# Patient Record
Sex: FEMALE | Race: WHITE | NOT HISPANIC OR LATINO
[De-identification: names, ages, dates, MRNs, and addresses within clinical notes are randomized per-mention and may not be internally consistent; named-entity substitution may affect disease eponyms.]

---

## 2017-10-24 ENCOUNTER — APPOINTMENT (OUTPATIENT)
Dept: DERMATOLOGY | Facility: CLINIC | Age: 57
End: 2017-10-24
Payer: COMMERCIAL

## 2017-10-24 PROCEDURE — 99214 OFFICE O/P EST MOD 30 MIN: CPT

## 2018-07-20 ENCOUNTER — INBOUND DOCUMENT (OUTPATIENT)
Age: 58
End: 2018-07-20

## 2018-10-08 ENCOUNTER — INBOUND DOCUMENT (OUTPATIENT)
Age: 58
End: 2018-10-08

## 2018-10-23 ENCOUNTER — APPOINTMENT (OUTPATIENT)
Dept: DERMATOLOGY | Facility: CLINIC | Age: 58
End: 2018-10-23
Payer: MEDICARE

## 2018-10-23 PROCEDURE — 99213 OFFICE O/P EST LOW 20 MIN: CPT

## 2019-02-15 ENCOUNTER — INBOUND DOCUMENT (OUTPATIENT)
Age: 59
End: 2019-02-15

## 2019-12-02 ENCOUNTER — APPOINTMENT (OUTPATIENT)
Dept: DERMATOLOGY | Facility: CLINIC | Age: 59
End: 2019-12-02
Payer: MEDICARE

## 2019-12-02 PROCEDURE — 99213 OFFICE O/P EST LOW 20 MIN: CPT

## 2020-12-03 ENCOUNTER — APPOINTMENT (OUTPATIENT)
Dept: DERMATOLOGY | Facility: CLINIC | Age: 60
End: 2020-12-03
Payer: MEDICARE

## 2020-12-03 PROCEDURE — 99214 OFFICE O/P EST MOD 30 MIN: CPT | Mod: 25

## 2020-12-03 PROCEDURE — 17110 DESTRUCTION B9 LES UP TO 14: CPT

## 2021-12-07 ENCOUNTER — APPOINTMENT (OUTPATIENT)
Dept: DERMATOLOGY | Facility: CLINIC | Age: 61
End: 2021-12-07

## 2022-12-15 ENCOUNTER — OFFICE (OUTPATIENT)
Dept: URBAN - METROPOLITAN AREA CLINIC 94 | Facility: CLINIC | Age: 62
Setting detail: OPHTHALMOLOGY
End: 2022-12-15
Payer: MEDICARE

## 2022-12-15 DIAGNOSIS — H44.23: ICD-10-CM

## 2022-12-15 DIAGNOSIS — H35.3131: ICD-10-CM

## 2022-12-15 DIAGNOSIS — H35.413: ICD-10-CM

## 2022-12-15 DIAGNOSIS — H43.811: ICD-10-CM

## 2022-12-15 PROCEDURE — 92014 COMPRE OPH EXAM EST PT 1/>: CPT | Performed by: SPECIALIST

## 2022-12-15 PROCEDURE — 92134 CPTRZ OPH DX IMG PST SGM RTA: CPT | Performed by: SPECIALIST

## 2022-12-15 PROCEDURE — 92235 FLUORESCEIN ANGRPH MLTIFRAME: CPT | Performed by: SPECIALIST

## 2022-12-15 ASSESSMENT — CONFRONTATIONAL VISUAL FIELD TEST (CVF)
OS_FINDINGS: FULL
OD_FINDINGS: FULL

## 2022-12-15 ASSESSMENT — KERATOMETRY
OS_AXISANGLE_DEGREES: 111
OD_K2POWER_DIOPTERS: 45.75
METHOD_AUTO_MANUAL: AUTO
OD_K1POWER_DIOPTERS: 45.75
OS_K1POWER_DIOPTERS: 46.00
OS_K2POWER_DIOPTERS: 46.50
OD_AXISANGLE_DEGREES: 090

## 2022-12-15 ASSESSMENT — VISUAL ACUITY
OD_BCVA: 20/70-1
OS_BCVA: 20/20

## 2022-12-15 ASSESSMENT — AXIALLENGTH_DERIVED
OS_AL: 23.6045
OD_AL: 22.7934

## 2022-12-15 ASSESSMENT — PACHYMETRY
OD_CT_UM: 591
OS_CT_UM: 571
OD_CT_CORRECTION: -4
OS_CT_CORRECTION: -2

## 2022-12-15 ASSESSMENT — REFRACTION_AUTOREFRACTION
OS_CYLINDER: -0.25
OS_SPHERE: -2.50
OS_AXIS: 073
OD_SPHERE: +0.50
OD_AXIS: 094
OD_CYLINDER: -1.00

## 2022-12-15 ASSESSMENT — LID EXAM ASSESSMENTS
OD_BLEPHARITIS: T 1+
OS_BLEPHARITIS: T 1+

## 2022-12-15 ASSESSMENT — SPHEQUIV_DERIVED
OS_SPHEQUIV: -2.625
OD_SPHEQUIV: 0

## 2022-12-15 ASSESSMENT — LID POSITION - PTOSIS
OD_PTOSIS: 2+
OS_PTOSIS: 2+

## 2022-12-15 ASSESSMENT — PUNCTA - ASSESSMENT: OD_PUNCTA: RLL

## 2022-12-22 ENCOUNTER — OFFICE (OUTPATIENT)
Dept: URBAN - METROPOLITAN AREA CLINIC 113 | Facility: CLINIC | Age: 62
Setting detail: OPHTHALMOLOGY
End: 2022-12-22
Payer: MEDICARE

## 2022-12-22 ENCOUNTER — RX ONLY (RX ONLY)
Age: 62
End: 2022-12-22

## 2022-12-22 DIAGNOSIS — H11.151: ICD-10-CM

## 2022-12-22 DIAGNOSIS — H02.821: ICD-10-CM

## 2022-12-22 DIAGNOSIS — H01.002: ICD-10-CM

## 2022-12-22 DIAGNOSIS — H01.005: ICD-10-CM

## 2022-12-22 PROCEDURE — 92012 INTRM OPH EXAM EST PATIENT: CPT | Performed by: OPHTHALMOLOGY

## 2022-12-22 ASSESSMENT — KERATOMETRY
OD_K1POWER_DIOPTERS: 45.75
OS_K2POWER_DIOPTERS: 46.50
OD_AXISANGLE_DEGREES: 090
OD_K2POWER_DIOPTERS: 45.75
OS_K1POWER_DIOPTERS: 46.00
METHOD_AUTO_MANUAL: AUTO
OS_AXISANGLE_DEGREES: 111

## 2022-12-22 ASSESSMENT — REFRACTION_AUTOREFRACTION
OS_SPHERE: -2.50
OD_SPHERE: +0.50
OS_CYLINDER: -0.25
OD_AXIS: 094
OD_CYLINDER: -1.00
OS_AXIS: 073

## 2022-12-22 ASSESSMENT — VISUAL ACUITY
OS_BCVA: 20/20
OD_BCVA: 20/100

## 2022-12-22 ASSESSMENT — PACHYMETRY
OS_CT_CORRECTION: -2
OD_CT_CORRECTION: -4
OD_CT_UM: 591
OS_CT_UM: 571

## 2022-12-22 ASSESSMENT — TONOMETRY
OD_IOP_MMHG: 19
OS_IOP_MMHG: 20

## 2022-12-22 ASSESSMENT — CONFRONTATIONAL VISUAL FIELD TEST (CVF)
OS_FINDINGS: FULL
OD_FINDINGS: FULL

## 2022-12-22 ASSESSMENT — AXIALLENGTH_DERIVED
OS_AL: 23.6045
OD_AL: 22.7934

## 2022-12-22 ASSESSMENT — PUNCTA - ASSESSMENT: OD_PUNCTA: RLL

## 2022-12-22 ASSESSMENT — LID POSITION - PTOSIS
OD_PTOSIS: 2+
OS_PTOSIS: 2+

## 2022-12-22 ASSESSMENT — SPHEQUIV_DERIVED
OD_SPHEQUIV: 0
OS_SPHEQUIV: -2.625

## 2022-12-22 ASSESSMENT — LID EXAM ASSESSMENTS
OD_BLEPHARITIS: T 1+
OS_BLEPHARITIS: T 1+

## 2023-02-23 ENCOUNTER — OFFICE (OUTPATIENT)
Dept: URBAN - METROPOLITAN AREA CLINIC 113 | Facility: CLINIC | Age: 63
Setting detail: OPHTHALMOLOGY
End: 2023-02-23
Payer: MEDICARE

## 2023-02-23 DIAGNOSIS — H01.005: ICD-10-CM

## 2023-02-23 DIAGNOSIS — H02.821: ICD-10-CM

## 2023-02-23 DIAGNOSIS — H11.151: ICD-10-CM

## 2023-02-23 DIAGNOSIS — H01.002: ICD-10-CM

## 2023-02-23 PROCEDURE — 99213 OFFICE O/P EST LOW 20 MIN: CPT | Performed by: OPHTHALMOLOGY

## 2023-02-23 ASSESSMENT — KERATOMETRY
OS_K2POWER_DIOPTERS: 46.50
OD_K2POWER_DIOPTERS: 45.75
OD_AXISANGLE_DEGREES: 090
METHOD_AUTO_MANUAL: AUTO
OS_K1POWER_DIOPTERS: 46.00
OD_K1POWER_DIOPTERS: 45.75
OS_AXISANGLE_DEGREES: 111

## 2023-02-23 ASSESSMENT — REFRACTION_AUTOREFRACTION
OD_CYLINDER: -1.00
OD_SPHERE: +0.50
OD_AXIS: 094
OS_AXIS: 073
OS_SPHERE: -2.50
OS_CYLINDER: -0.25

## 2023-02-23 ASSESSMENT — LID EXAM ASSESSMENTS
OS_COMMENTS: DERMATITIS
OD_MEIBOMITIS: RLL T
OD_COMMENTS: DERMATITIS
OS_MEIBOMITIS: LLL T
OS_BLEPHARITIS: LLL T 1+
OD_BLEPHARITIS: RLL T 1+

## 2023-02-23 ASSESSMENT — VISUAL ACUITY
OS_BCVA: 20/20
OD_BCVA: 20/200

## 2023-02-23 ASSESSMENT — LID POSITION - PTOSIS
OD_PTOSIS: 2+
OS_PTOSIS: 2+

## 2023-02-23 ASSESSMENT — CONFRONTATIONAL VISUAL FIELD TEST (CVF)
OD_FINDINGS: FULL
OS_FINDINGS: FULL

## 2023-02-23 ASSESSMENT — AXIALLENGTH_DERIVED
OD_AL: 22.7934
OS_AL: 23.6045

## 2023-02-23 ASSESSMENT — SPHEQUIV_DERIVED
OD_SPHEQUIV: 0
OS_SPHEQUIV: -2.625

## 2023-06-15 ENCOUNTER — OFFICE (OUTPATIENT)
Dept: URBAN - METROPOLITAN AREA CLINIC 94 | Facility: CLINIC | Age: 63
Setting detail: OPHTHALMOLOGY
End: 2023-06-15
Payer: MEDICARE

## 2023-06-15 DIAGNOSIS — H35.3131: ICD-10-CM

## 2023-06-15 DIAGNOSIS — H35.413: ICD-10-CM

## 2023-06-15 DIAGNOSIS — H43.811: ICD-10-CM

## 2023-06-15 DIAGNOSIS — H44.23: ICD-10-CM

## 2023-06-15 PROCEDURE — 92134 CPTRZ OPH DX IMG PST SGM RTA: CPT | Performed by: SPECIALIST

## 2023-06-15 PROCEDURE — 92012 INTRM OPH EXAM EST PATIENT: CPT | Performed by: SPECIALIST

## 2023-06-15 PROCEDURE — 92235 FLUORESCEIN ANGRPH MLTIFRAME: CPT | Performed by: SPECIALIST

## 2023-06-15 ASSESSMENT — PACHYMETRY
OS_CT_CORRECTION: -2
OS_CT_UM: 571
OD_CT_CORRECTION: -4
OD_CT_UM: 591

## 2023-06-15 ASSESSMENT — LID EXAM ASSESSMENTS
OS_MEIBOMITIS: LLL T
OD_BLEPHARITIS: RLL T 1+
OS_BLEPHARITIS: LLL T 1+
OD_MEIBOMITIS: RLL T
OS_COMMENTS: DERMATITIS
OD_COMMENTS: DERMATITIS

## 2023-06-15 ASSESSMENT — LID POSITION - PTOSIS
OS_PTOSIS: 2+
OD_PTOSIS: 2+

## 2023-06-15 ASSESSMENT — KERATOMETRY
OS_K1POWER_DIOPTERS: 46.00
OD_K1POWER_DIOPTERS: 45.75
OD_K2POWER_DIOPTERS: 45.75
OD_AXISANGLE_DEGREES: 090
METHOD_AUTO_MANUAL: AUTO
OS_K2POWER_DIOPTERS: 46.50
OS_AXISANGLE_DEGREES: 111

## 2023-06-15 ASSESSMENT — SPHEQUIV_DERIVED
OS_SPHEQUIV: -2.625
OD_SPHEQUIV: 0

## 2023-06-15 ASSESSMENT — REFRACTION_AUTOREFRACTION
OS_CYLINDER: -0.25
OD_CYLINDER: -1.00
OD_AXIS: 094
OD_SPHERE: +0.50
OS_AXIS: 073
OS_SPHERE: -2.50

## 2023-06-15 ASSESSMENT — TONOMETRY
OS_IOP_MMHG: 16
OD_IOP_MMHG: 19

## 2023-06-15 ASSESSMENT — AXIALLENGTH_DERIVED
OD_AL: 22.7934
OS_AL: 23.6045

## 2023-06-15 ASSESSMENT — VISUAL ACUITY
OS_BCVA: 20/20
OD_BCVA: 20/200

## 2023-06-19 ENCOUNTER — OFFICE (OUTPATIENT)
Dept: URBAN - METROPOLITAN AREA CLINIC 113 | Facility: CLINIC | Age: 63
Setting detail: OPHTHALMOLOGY
End: 2023-06-19
Payer: MEDICARE

## 2023-06-19 ENCOUNTER — RX ONLY (RX ONLY)
Age: 63
End: 2023-06-19

## 2023-06-19 DIAGNOSIS — H40.013: ICD-10-CM

## 2023-06-19 DIAGNOSIS — Z96.1: ICD-10-CM

## 2023-06-19 PROBLEM — H02.403 PTOSIS OF EYELID, UNSPECIFIED; BOTH EYES: Status: ACTIVE | Noted: 2023-06-19

## 2023-06-19 PROCEDURE — 92133 CPTRZD OPH DX IMG PST SGM ON: CPT | Performed by: OPTOMETRIST

## 2023-06-19 PROCEDURE — 92012 INTRM OPH EXAM EST PATIENT: CPT | Performed by: OPTOMETRIST

## 2023-06-19 PROCEDURE — 92083 EXTENDED VISUAL FIELD XM: CPT | Performed by: OPTOMETRIST

## 2023-06-19 ASSESSMENT — REFRACTION_AUTOREFRACTION
OD_AXIS: 097
OS_CYLINDER: -0.50
OS_AXIS: 080
OS_SPHERE: -2.25
OD_SPHERE: 0.00
OD_CYLINDER: -0.25

## 2023-06-19 ASSESSMENT — KERATOMETRY
METHOD_AUTO_MANUAL: AUTO
OD_K1POWER_DIOPTERS: 45.75
OS_K1POWER_DIOPTERS: 46.00
OS_K2POWER_DIOPTERS: 46.50
OS_AXISANGLE_DEGREES: 111
OD_K2POWER_DIOPTERS: 45.75
OD_AXISANGLE_DEGREES: 090

## 2023-06-19 ASSESSMENT — PACHYMETRY
OD_CT_CORRECTION: -4
OD_CT_UM: 591
OS_CT_UM: 571
OS_CT_CORRECTION: -2

## 2023-06-19 ASSESSMENT — TONOMETRY
OS_IOP_MMHG: 20
OD_IOP_MMHG: 19
OS_IOP_MMHG: 19
OD_IOP_MMHG: 21

## 2023-06-19 ASSESSMENT — VISUAL ACUITY
OS_BCVA: 20/20-
OD_BCVA: 20/150

## 2023-06-19 ASSESSMENT — LID POSITION - PTOSIS
OD_PTOSIS: 2+
OS_PTOSIS: 2+

## 2023-06-19 ASSESSMENT — CONFRONTATIONAL VISUAL FIELD TEST (CVF)
OS_FINDINGS: FULL
OD_FINDINGS: FULL

## 2023-06-19 ASSESSMENT — SPHEQUIV_DERIVED
OD_SPHEQUIV: -0.125
OS_SPHEQUIV: -2.5

## 2023-06-19 ASSESSMENT — AXIALLENGTH_DERIVED
OD_AL: 22.839
OS_AL: 23.5559

## 2023-12-14 ENCOUNTER — OFFICE (OUTPATIENT)
Dept: URBAN - METROPOLITAN AREA CLINIC 94 | Facility: CLINIC | Age: 63
Setting detail: OPHTHALMOLOGY
End: 2023-12-14
Payer: MEDICARE

## 2023-12-14 ENCOUNTER — RX ONLY (RX ONLY)
Age: 63
End: 2023-12-14

## 2023-12-14 DIAGNOSIS — H44.23: ICD-10-CM

## 2023-12-14 DIAGNOSIS — H35.3131: ICD-10-CM

## 2023-12-14 DIAGNOSIS — H35.413: ICD-10-CM

## 2023-12-14 DIAGNOSIS — H43.811: ICD-10-CM

## 2023-12-14 PROCEDURE — 92134 CPTRZ OPH DX IMG PST SGM RTA: CPT | Performed by: SPECIALIST

## 2023-12-14 PROCEDURE — 92014 COMPRE OPH EXAM EST PT 1/>: CPT | Performed by: SPECIALIST

## 2023-12-14 PROCEDURE — 92235 FLUORESCEIN ANGRPH MLTIFRAME: CPT | Performed by: SPECIALIST

## 2023-12-14 ASSESSMENT — CONFRONTATIONAL VISUAL FIELD TEST (CVF)
OS_FINDINGS: FULL
OD_FINDINGS: FULL

## 2023-12-14 ASSESSMENT — LID POSITION - PTOSIS
OD_PTOSIS: 2+
OS_PTOSIS: 2+

## 2023-12-14 ASSESSMENT — REFRACTION_AUTOREFRACTION
OS_CYLINDER: -0.50
OS_SPHERE: -2.25
OD_CYLINDER: -0.25
OS_AXIS: 080
OD_AXIS: 097
OD_SPHERE: 0.00

## 2023-12-14 ASSESSMENT — SPHEQUIV_DERIVED
OD_SPHEQUIV: -0.125
OS_SPHEQUIV: -2.5

## 2024-01-24 ENCOUNTER — OFFICE (OUTPATIENT)
Dept: URBAN - METROPOLITAN AREA CLINIC 113 | Facility: CLINIC | Age: 64
Setting detail: OPHTHALMOLOGY
End: 2024-01-24
Payer: MEDICARE

## 2024-01-24 DIAGNOSIS — H02.89: ICD-10-CM

## 2024-01-24 DIAGNOSIS — H01.001: ICD-10-CM

## 2024-01-24 DIAGNOSIS — H01.004: ICD-10-CM

## 2024-01-24 PROCEDURE — 99213 OFFICE O/P EST LOW 20 MIN: CPT | Performed by: OPTOMETRIST

## 2024-01-24 ASSESSMENT — LID EXAM ASSESSMENTS
OD_BLEPHARITIS: 1+
OS_BLEPHARITIS: 1+
OS_MEIBOMITIS: 1+
OD_MEIBOMITIS: 1+
OD_COMMENTS: NO DERMATITIS

## 2024-01-24 ASSESSMENT — REFRACTION_AUTOREFRACTION
OD_SPHERE: +0.25
OS_AXIS: 059
OD_CYLINDER: -0.75
OD_AXIS: 070
OS_SPHERE: -2.25
OS_CYLINDER: -0.50

## 2024-01-24 ASSESSMENT — SPHEQUIV_DERIVED
OS_SPHEQUIV: -2.5
OD_SPHEQUIV: -0.125

## 2024-01-24 ASSESSMENT — CONFRONTATIONAL VISUAL FIELD TEST (CVF)
OD_FINDINGS: FULL
OS_FINDINGS: FULL

## 2024-01-24 ASSESSMENT — LID POSITION - PTOSIS
OS_PTOSIS: 2+
OD_PTOSIS: 2+

## 2024-06-11 ENCOUNTER — APPOINTMENT (OUTPATIENT)
Dept: GASTROENTEROLOGY | Facility: CLINIC | Age: 64
End: 2024-06-11
Payer: MEDICARE

## 2024-06-11 VITALS — SYSTOLIC BLOOD PRESSURE: 102 MMHG | DIASTOLIC BLOOD PRESSURE: 80 MMHG | WEIGHT: 125 LBS

## 2024-06-11 DIAGNOSIS — K21.9 GASTRO-ESOPHAGEAL REFLUX DISEASE W/OUT ESOPHAGITIS: ICD-10-CM

## 2024-06-11 DIAGNOSIS — R15.9 FULL INCONTINENCE OF FECES: ICD-10-CM

## 2024-06-11 DIAGNOSIS — Z86.010 PERSONAL HISTORY OF COLONIC POLYPS: ICD-10-CM

## 2024-06-11 DIAGNOSIS — C85.19 UNSPECIFIED B-CELL LYMPHOMA, EXTRANODAL AND SOLID ORGAN SITES: ICD-10-CM

## 2024-06-11 PROCEDURE — 99204 OFFICE O/P NEW MOD 45 MIN: CPT

## 2024-06-11 RX ORDER — SODIUM SULFATE, POTASSIUM SULFATE AND MAGNESIUM SULFATE 1.6; 3.13; 17.5 G/177ML; G/177ML; G/177ML
17.5-3.13-1.6 SOLUTION ORAL
Qty: 1 | Refills: 0 | Status: ACTIVE | COMMUNITY
Start: 2024-06-11 | End: 1900-01-01

## 2024-06-11 NOTE — PHYSICAL EXAM
[Alert] : alert [Healthy Appearing] : healthy appearing [Sclera] : the sclera and conjunctiva were normal [Hearing Threshold Finger Rub Not [place]] : hearing was normal [Normal Appearance] : the appearance of the neck was normal [No Respiratory Distress] : no respiratory distress [Auscultation Breath Sounds / Voice Sounds] : lungs were clear to auscultation bilaterally [Heart Rate And Rhythm] : heart rate was normal and rhythm regular [Bowel Sounds] : normal bowel sounds [Abdomen Tenderness] : non-tender [Abdomen Soft] : soft [Abnormal Walk] : normal gait [Normal Color / Pigmentation] : normal skin color and pigmentation [Oriented To Time, Place, And Person] : oriented to person, place, and time

## 2024-06-17 ENCOUNTER — OFFICE (OUTPATIENT)
Dept: URBAN - METROPOLITAN AREA CLINIC 113 | Facility: CLINIC | Age: 64
Setting detail: OPHTHALMOLOGY
End: 2024-06-17
Payer: MEDICARE

## 2024-06-17 DIAGNOSIS — H40.013: ICD-10-CM

## 2024-06-17 DIAGNOSIS — H01.004: ICD-10-CM

## 2024-06-17 DIAGNOSIS — H01.001: ICD-10-CM

## 2024-06-17 DIAGNOSIS — H02.89: ICD-10-CM

## 2024-06-17 PROCEDURE — 92012 INTRM OPH EXAM EST PATIENT: CPT | Performed by: OPTOMETRIST

## 2024-06-17 PROCEDURE — 92133 CPTRZD OPH DX IMG PST SGM ON: CPT | Performed by: OPTOMETRIST

## 2024-06-17 ASSESSMENT — LID EXAM ASSESSMENTS
OD_MEIBOMITIS: 1+
OS_MEIBOMITIS: 1+
OD_BLEPHARITIS: RUL 1+
OS_BLEPHARITIS: LUL 1+

## 2024-06-17 ASSESSMENT — LID POSITION - PTOSIS
OS_PTOSIS: 2+
OD_PTOSIS: 2+

## 2024-06-17 ASSESSMENT — CONFRONTATIONAL VISUAL FIELD TEST (CVF)
OD_FINDINGS: FULL
OS_FINDINGS: FULL

## 2024-06-18 NOTE — HISTORY OF PRESENT ILLNESS
[FreeTextEntry1] : Lisbet Griffin is a 64 year old female presenting today to [facility] care. Pt was previously seen by Dr. Carrizales. Last EGD and colonoscopy was about 5 years ago, does have personal history of colonic polyps. Unknown FMH because pt was adopted. 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 Does report over the last several years has had progressively worsening both urinary and fecal incontinence. Has seen urogynecology and is discussing possible stimulator to alleviate. Denies any bleeding per rectum such as melena or hematochezia. Also notes she struggles chronically with GERD symptoms, takes omeprazole daily but does still note some intermittent symptoms. Denies overt nausea, vomiting, dysphagia.

## 2024-06-18 NOTE — ASSESSMENT
[FreeTextEntry1] : Plan: Discussed at length with patient that incontinence possibly related to pelvic floor dysfunction since also struggles with urinary incontinence. Will perform random biopsies with colonoscopy to ensure no underlying etiology. Will also perform EGD since pt reports GERD symptoms. Risks versus benefits as well as instructions reviewed, pt agrees to planned procedures.    I, Dr. Ya, personally performed the evaluation and management (E/M) services for this new patient. That E/M includes conducting the initial examination, assessing all conditions, and establishing the plan of care. Today, my NPP, Irene Tinoco, was here to observe my evaluation and management services for this patient to be followed going forward

## 2024-07-25 ENCOUNTER — OFFICE (OUTPATIENT)
Dept: URBAN - METROPOLITAN AREA CLINIC 94 | Facility: CLINIC | Age: 64
Setting detail: OPHTHALMOLOGY
End: 2024-07-25
Payer: MEDICARE

## 2024-07-25 DIAGNOSIS — H43.811: ICD-10-CM

## 2024-07-25 DIAGNOSIS — H35.3131: ICD-10-CM

## 2024-07-25 DIAGNOSIS — H44.23: ICD-10-CM

## 2024-07-25 DIAGNOSIS — H35.413: ICD-10-CM

## 2024-07-25 PROCEDURE — 92134 CPTRZ OPH DX IMG PST SGM RTA: CPT | Performed by: SPECIALIST

## 2024-07-25 PROCEDURE — 92235 FLUORESCEIN ANGRPH MLTIFRAME: CPT | Performed by: SPECIALIST

## 2024-07-25 PROCEDURE — 92014 COMPRE OPH EXAM EST PT 1/>: CPT | Performed by: SPECIALIST

## 2024-07-25 ASSESSMENT — LID EXAM ASSESSMENTS
OS_MEIBOMITIS: 1+
OS_BLEPHARITIS: LUL 1+
OD_BLEPHARITIS: RUL 1+
OD_MEIBOMITIS: 1+

## 2024-07-25 ASSESSMENT — LID POSITION - PTOSIS
OD_PTOSIS: 2+
OS_PTOSIS: 2+

## 2024-07-25 ASSESSMENT — CONFRONTATIONAL VISUAL FIELD TEST (CVF)
OS_FINDINGS: FULL
OD_FINDINGS: FULL

## 2024-09-25 ENCOUNTER — OFFICE (OUTPATIENT)
Dept: URBAN - METROPOLITAN AREA CLINIC 115 | Facility: CLINIC | Age: 64
Setting detail: OPHTHALMOLOGY
End: 2024-09-25
Payer: MEDICARE

## 2024-09-25 DIAGNOSIS — B30.9: ICD-10-CM

## 2024-09-25 PROCEDURE — 99212 OFFICE O/P EST SF 10 MIN: CPT | Performed by: OPTOMETRIST

## 2024-09-25 ASSESSMENT — LID EXAM ASSESSMENTS
OS_BLEPHARITIS: LUL 1+
OD_BLEPHARITIS: RUL 1+
OS_MEIBOMITIS: 1+
OD_MEIBOMITIS: 1+

## 2024-09-25 ASSESSMENT — CONFRONTATIONAL VISUAL FIELD TEST (CVF)
OS_FINDINGS: FULL
OD_FINDINGS: FULL

## 2024-09-25 ASSESSMENT — LID POSITION - PTOSIS
OS_PTOSIS: 2+
OD_PTOSIS: 2+

## 2024-10-23 ENCOUNTER — OFFICE (OUTPATIENT)
Dept: URBAN - METROPOLITAN AREA CLINIC 115 | Facility: CLINIC | Age: 64
Setting detail: OPHTHALMOLOGY
End: 2024-10-23
Payer: MEDICARE

## 2024-10-23 DIAGNOSIS — B00.52: ICD-10-CM

## 2024-10-23 PROCEDURE — 99213 OFFICE O/P EST LOW 20 MIN: CPT | Performed by: OPTOMETRIST

## 2024-10-23 ASSESSMENT — KERATOMETRY
OD_K2POWER_DIOPTERS: 45.75
OS_AXISANGLE_DEGREES: 149
OS_K1POWER_DIOPTERS: 45.75
OD_K1POWER_DIOPTERS: 45.50
METHOD_AUTO_MANUAL: AUTO
OD_AXISANGLE_DEGREES: 005
OS_K2POWER_DIOPTERS: 46.25

## 2024-10-23 ASSESSMENT — LID POSITION - PTOSIS
OS_PTOSIS: 2+
OD_PTOSIS: 2+

## 2024-10-23 ASSESSMENT — VISUAL ACUITY
OD_BCVA: 20/150
OS_BCVA: 20/20

## 2024-10-23 ASSESSMENT — REFRACTION_AUTOREFRACTION
OD_CYLINDER: -1.00
OD_SPHERE: DEFER
OS_CYLINDER: -0.75
OD_AXIS: 090
OS_AXIS: 071
OS_SPHERE: DEFER

## 2024-10-23 ASSESSMENT — CONFRONTATIONAL VISUAL FIELD TEST (CVF)
OS_FINDINGS: FULL
OD_FINDINGS: FULL

## 2024-10-23 ASSESSMENT — PACHYMETRY
OS_CT_UM: 571
OD_CT_UM: 591
OD_CT_CORRECTION: -4
OS_CT_CORRECTION: -2

## 2024-10-23 ASSESSMENT — LID EXAM ASSESSMENTS
OS_MEIBOMITIS: 1+
OS_BLEPHARITIS: LUL 1+
OD_MEIBOMITIS: 1+
OD_BLEPHARITIS: RUL 1+

## 2024-11-01 ENCOUNTER — OFFICE (OUTPATIENT)
Dept: URBAN - METROPOLITAN AREA CLINIC 115 | Facility: CLINIC | Age: 64
Setting detail: OPHTHALMOLOGY
End: 2024-11-01
Payer: MEDICARE

## 2024-11-01 DIAGNOSIS — B00.52: ICD-10-CM

## 2024-11-01 PROCEDURE — 99212 OFFICE O/P EST SF 10 MIN: CPT | Performed by: OPTOMETRIST

## 2024-11-01 ASSESSMENT — REFRACTION_AUTOREFRACTION
OS_AXIS: 076
OS_SPHERE: -2.25
OD_SPHERE: 0.00
OD_CYLINDER: -0.25
OD_AXIS: 113
OS_CYLINDER: -0.50

## 2024-11-01 ASSESSMENT — PACHYMETRY
OS_CT_CORRECTION: -2
OS_CT_UM: 571
OD_CT_CORRECTION: -4
OD_CT_UM: 591

## 2024-11-01 ASSESSMENT — CONFRONTATIONAL VISUAL FIELD TEST (CVF)
OS_FINDINGS: FULL
OD_FINDINGS: FULL

## 2024-11-01 ASSESSMENT — VISUAL ACUITY
OS_BCVA: 20/20
OD_BCVA: 20/200

## 2024-11-01 ASSESSMENT — LID EXAM ASSESSMENTS
OS_BLEPHARITIS: LUL 1+
OD_MEIBOMITIS: 1+
OD_BLEPHARITIS: RUL 1+
OS_MEIBOMITIS: 1+

## 2024-11-01 ASSESSMENT — KERATOMETRY
OS_K2POWER_DIOPTERS: 46.25
METHOD_AUTO_MANUAL: AUTO
OD_AXISANGLE_DEGREES: 005
OD_K1POWER_DIOPTERS: 45.50
OS_AXISANGLE_DEGREES: 149
OS_K1POWER_DIOPTERS: 45.75
OD_K2POWER_DIOPTERS: 45.75

## 2024-11-01 ASSESSMENT — LID POSITION - PTOSIS
OD_PTOSIS: 2+
OS_PTOSIS: 2+

## 2024-12-04 ENCOUNTER — OFFICE (OUTPATIENT)
Dept: URBAN - METROPOLITAN AREA CLINIC 115 | Facility: CLINIC | Age: 64
Setting detail: OPHTHALMOLOGY
End: 2024-12-04
Payer: MEDICARE

## 2024-12-04 DIAGNOSIS — H16.421: ICD-10-CM

## 2024-12-04 PROBLEM — H11.041 PTERYGIUM-PERIPHERAL; RIGHT EYE: Status: ACTIVE | Noted: 2024-12-04

## 2024-12-04 PROCEDURE — 92012 INTRM OPH EXAM EST PATIENT: CPT | Performed by: OPTOMETRIST

## 2024-12-04 ASSESSMENT — REFRACTION_AUTOREFRACTION
OS_CYLINDER: -0.50
OS_SPHERE: -2.00
OD_CYLINDER: -0.50
OD_SPHERE: +0.50
OS_AXIS: 069
OD_AXIS: 117

## 2024-12-04 ASSESSMENT — LID EXAM ASSESSMENTS
OS_BLEPHARITIS: LUL 1+
OD_BLEPHARITIS: RUL 1+

## 2024-12-04 ASSESSMENT — KERATOMETRY
OD_K1POWER_DIOPTERS: 45.50
OD_AXISANGLE_DEGREES: 005
OD_K2POWER_DIOPTERS: 45.75
METHOD_AUTO_MANUAL: AUTO
OS_K1POWER_DIOPTERS: 45.75
OS_AXISANGLE_DEGREES: 149
OS_K2POWER_DIOPTERS: 46.25

## 2024-12-04 ASSESSMENT — PACHYMETRY
OD_CT_CORRECTION: -4
OS_CT_CORRECTION: -2
OS_CT_UM: 571
OD_CT_UM: 591

## 2024-12-04 ASSESSMENT — CONFRONTATIONAL VISUAL FIELD TEST (CVF)
OS_FINDINGS: FULL
OD_FINDINGS: FULL

## 2024-12-04 ASSESSMENT — VISUAL ACUITY
OD_BCVA: 20/150
OS_BCVA: 20/20

## 2024-12-04 ASSESSMENT — VASCULARIZATION: OD_VASCULARIZATION: PANNUS

## 2024-12-04 ASSESSMENT — CORNEAL PTERYGIUM: OD_PTERYGIUM: NASAL 3MM

## 2024-12-04 ASSESSMENT — LID POSITION - PTOSIS
OS_PTOSIS: 2+
OD_PTOSIS: 2+

## 2025-01-07 ENCOUNTER — APPOINTMENT (OUTPATIENT)
Dept: GASTROENTEROLOGY | Facility: AMBULATORY MEDICAL SERVICES | Age: 65
End: 2025-01-07
Payer: MEDICARE

## 2025-01-07 ENCOUNTER — RESULT REVIEW (OUTPATIENT)
Age: 65
End: 2025-01-07

## 2025-01-07 PROCEDURE — 45380 COLONOSCOPY AND BIOPSY: CPT

## 2025-01-07 PROCEDURE — 43239 EGD BIOPSY SINGLE/MULTIPLE: CPT

## 2025-01-27 ENCOUNTER — RX ONLY (RX ONLY)
Age: 65
End: 2025-01-27

## 2025-01-27 ENCOUNTER — OFFICE (OUTPATIENT)
Dept: URBAN - METROPOLITAN AREA CLINIC 1 | Facility: CLINIC | Age: 65
Setting detail: OPHTHALMOLOGY
End: 2025-01-27
Payer: MEDICARE

## 2025-01-27 DIAGNOSIS — B00.52: ICD-10-CM

## 2025-01-27 DIAGNOSIS — H17.9: ICD-10-CM

## 2025-01-27 PROCEDURE — 99213 OFFICE O/P EST LOW 20 MIN: CPT | Performed by: OPHTHALMOLOGY

## 2025-01-27 PROCEDURE — 92025 CPTRIZED CORNEAL TOPOGRAPHY: CPT | Performed by: OPHTHALMOLOGY

## 2025-01-27 ASSESSMENT — REFRACTION_AUTOREFRACTION
OS_SPHERE: -2.50
OD_SPHERE: +0.50
OS_CYLINDER: -0.50
OD_CYLINDER: -0.75
OS_AXIS: 048
OD_AXIS: 080

## 2025-01-27 ASSESSMENT — LID POSITION - PTOSIS
OS_PTOSIS: 2+
OD_PTOSIS: 2+

## 2025-01-27 ASSESSMENT — VISUAL ACUITY
OS_BCVA: 20/25
OD_BCVA: 20/200

## 2025-01-27 ASSESSMENT — KERATOMETRY
OD_K2POWER_DIOPTERS: 45.75
OS_K1POWER_DIOPTERS: 45.75
METHOD_AUTO_MANUAL: AUTO
OS_K2POWER_DIOPTERS: 46.25
OD_K1POWER_DIOPTERS: 45.50
OS_AXISANGLE_DEGREES: 110
OD_AXISANGLE_DEGREES: 100

## 2025-01-27 ASSESSMENT — LID EXAM ASSESSMENTS
OS_BLEPHARITIS: LUL 1+
OD_BLEPHARITIS: RUL 1+

## 2025-01-27 ASSESSMENT — PACHYMETRY
OS_CT_UM: 571
OD_CT_CORRECTION: -4
OS_CT_CORRECTION: -2
OD_CT_UM: 591

## 2025-01-27 ASSESSMENT — TONOMETRY
OS_IOP_MMHG: 17
OD_IOP_MMHG: 20

## 2025-02-03 ENCOUNTER — RX ONLY (RX ONLY)
Age: 65
End: 2025-02-03

## 2025-02-03 ENCOUNTER — OFFICE (OUTPATIENT)
Dept: URBAN - METROPOLITAN AREA CLINIC 94 | Facility: CLINIC | Age: 65
Setting detail: OPHTHALMOLOGY
End: 2025-02-03
Payer: MEDICARE

## 2025-02-03 DIAGNOSIS — H44.23: ICD-10-CM

## 2025-02-03 DIAGNOSIS — H35.3131: ICD-10-CM

## 2025-02-03 DIAGNOSIS — H35.413: ICD-10-CM

## 2025-02-03 DIAGNOSIS — H43.811: ICD-10-CM

## 2025-02-03 PROCEDURE — 92134 CPTRZ OPH DX IMG PST SGM RTA: CPT | Performed by: SPECIALIST

## 2025-02-03 PROCEDURE — 92235 FLUORESCEIN ANGRPH MLTIFRAME: CPT | Performed by: SPECIALIST

## 2025-02-03 PROCEDURE — 92014 COMPRE OPH EXAM EST PT 1/>: CPT | Performed by: SPECIALIST

## 2025-02-03 ASSESSMENT — PACHYMETRY
OS_CT_UM: 571
OS_CT_CORRECTION: -2
OD_CT_CORRECTION: -4
OD_CT_UM: 591

## 2025-02-03 ASSESSMENT — CONFRONTATIONAL VISUAL FIELD TEST (CVF)
OD_FINDINGS: FULL
OS_FINDINGS: FULL

## 2025-02-03 ASSESSMENT — LID POSITION - PTOSIS
OS_PTOSIS: 2+
OD_PTOSIS: 2+

## 2025-02-03 ASSESSMENT — REFRACTION_AUTOREFRACTION
OD_SPHERE: +0.50
OD_AXIS: 080
OS_CYLINDER: -0.50
OD_CYLINDER: -0.75
OS_AXIS: 048
OS_SPHERE: -2.50

## 2025-02-03 ASSESSMENT — KERATOMETRY
OS_K1POWER_DIOPTERS: 45.75
OS_AXISANGLE_DEGREES: 110
METHOD_AUTO_MANUAL: AUTO
OD_K2POWER_DIOPTERS: 45.75
OD_AXISANGLE_DEGREES: 100
OD_K1POWER_DIOPTERS: 45.50
OS_K2POWER_DIOPTERS: 46.25

## 2025-02-03 ASSESSMENT — TONOMETRY: OS_IOP_MMHG: 19

## 2025-02-03 ASSESSMENT — VISUAL ACUITY
OD_BCVA: 20/200
OS_BCVA: 20/25

## 2025-02-03 ASSESSMENT — LID EXAM ASSESSMENTS
OD_BLEPHARITIS: RUL 1+
OS_BLEPHARITIS: LUL 1+

## 2025-02-20 ENCOUNTER — OFFICE (OUTPATIENT)
Dept: URBAN - METROPOLITAN AREA CLINIC 1 | Facility: CLINIC | Age: 65
Setting detail: OPHTHALMOLOGY
End: 2025-02-20
Payer: MEDICARE

## 2025-02-20 DIAGNOSIS — H17.9: ICD-10-CM

## 2025-02-20 DIAGNOSIS — B00.52: ICD-10-CM

## 2025-02-20 PROCEDURE — 99213 OFFICE O/P EST LOW 20 MIN: CPT | Performed by: OPHTHALMOLOGY

## 2025-02-20 PROCEDURE — 92285 EXTERNAL OCULAR PHOTOGRAPHY: CPT | Performed by: OPHTHALMOLOGY

## 2025-02-20 ASSESSMENT — PACHYMETRY
OS_CT_CORRECTION: -2
OD_CT_CORRECTION: -4
OS_CT_UM: 571
OD_CT_UM: 591

## 2025-02-20 ASSESSMENT — TONOMETRY
OS_IOP_MMHG: 10
OD_IOP_MMHG: 20

## 2025-02-20 ASSESSMENT — KERATOMETRY
OS_AXISANGLE_DEGREES: 110
OD_K1POWER_DIOPTERS: 45.50
OD_AXISANGLE_DEGREES: 100
OS_K1POWER_DIOPTERS: 45.75
METHOD_AUTO_MANUAL: AUTO
OD_K2POWER_DIOPTERS: 45.75
OS_K2POWER_DIOPTERS: 46.25

## 2025-02-20 ASSESSMENT — LID POSITION - PTOSIS
OD_PTOSIS: 2+
OS_PTOSIS: 2+

## 2025-02-20 ASSESSMENT — REFRACTION_AUTOREFRACTION
OS_AXIS: 061
OS_SPHERE: -1.75
OD_SPHERE: +0.25
OS_CYLINDER: -1.00
OD_CYLINDER: -0.75
OD_AXIS: 094

## 2025-02-20 ASSESSMENT — VISUAL ACUITY
OS_BCVA: 20/25
OD_BCVA: 20/100-1

## 2025-02-20 ASSESSMENT — CONFRONTATIONAL VISUAL FIELD TEST (CVF)
OS_FINDINGS: FULL
OD_FINDINGS: FULL

## 2025-02-20 ASSESSMENT — LID EXAM ASSESSMENTS
OS_BLEPHARITIS: LUL 1+
OD_BLEPHARITIS: RUL 1+

## 2025-03-20 ENCOUNTER — RX ONLY (RX ONLY)
Age: 65
End: 2025-03-20

## 2025-03-20 ENCOUNTER — OFFICE (OUTPATIENT)
Dept: URBAN - METROPOLITAN AREA CLINIC 1 | Facility: CLINIC | Age: 65
Setting detail: OPHTHALMOLOGY
End: 2025-03-20
Payer: MEDICARE

## 2025-03-20 DIAGNOSIS — B00.52: ICD-10-CM

## 2025-03-20 DIAGNOSIS — H17.9: ICD-10-CM

## 2025-03-20 PROCEDURE — 99213 OFFICE O/P EST LOW 20 MIN: CPT | Performed by: OPHTHALMOLOGY

## 2025-03-20 ASSESSMENT — REFRACTION_AUTOREFRACTION
OD_SPHERE: +0.50
OS_SPHERE: -2.00
OD_AXIS: 092
OS_CYLINDER: -0.75
OD_CYLINDER: -0.75
OS_AXIS: 062

## 2025-03-20 ASSESSMENT — LID EXAM ASSESSMENTS
OS_BLEPHARITIS: LUL 1+
OD_BLEPHARITIS: RUL 1+

## 2025-03-20 ASSESSMENT — KERATOMETRY
METHOD_AUTO_MANUAL: AUTO
OS_AXISANGLE_DEGREES: 123
OS_K2POWER_DIOPTERS: 46.00
OD_K1POWER_DIOPTERS: 44.50
OD_AXISANGLE_DEGREES: 017
OD_K2POWER_DIOPTERS: 45.00
OS_K1POWER_DIOPTERS: 45.75

## 2025-03-20 ASSESSMENT — VISUAL ACUITY
OS_BCVA: 20/25
OD_BCVA: 20/125

## 2025-03-20 ASSESSMENT — TONOMETRY
OD_IOP_MMHG: 20
OS_IOP_MMHG: 20

## 2025-03-20 ASSESSMENT — PACHYMETRY
OD_CT_CORRECTION: -4
OS_CT_CORRECTION: -2
OD_CT_UM: 591
OS_CT_UM: 571

## 2025-03-20 ASSESSMENT — CONFRONTATIONAL VISUAL FIELD TEST (CVF)
OS_FINDINGS: FULL
OD_FINDINGS: FULL

## 2025-03-20 ASSESSMENT — LID POSITION - PTOSIS
OS_PTOSIS: 2+
OD_PTOSIS: 2+

## 2025-06-16 ENCOUNTER — OFFICE (OUTPATIENT)
Dept: URBAN - METROPOLITAN AREA CLINIC 113 | Facility: CLINIC | Age: 65
Setting detail: OPHTHALMOLOGY
End: 2025-06-16
Payer: MEDICARE

## 2025-06-16 DIAGNOSIS — H40.013: ICD-10-CM

## 2025-06-16 PROCEDURE — 92083 EXTENDED VISUAL FIELD XM: CPT | Performed by: OPTOMETRIST

## 2025-06-16 PROCEDURE — 92014 COMPRE OPH EXAM EST PT 1/>: CPT | Performed by: OPTOMETRIST

## 2025-06-16 PROCEDURE — 92133 CPTRZD OPH DX IMG PST SGM ON: CPT | Performed by: OPTOMETRIST

## 2025-06-16 ASSESSMENT — REFRACTION_AUTOREFRACTION
OD_SPHERE: +0.50
OS_SPHERE: -2.25
OD_AXIS: 154
OS_CYLINDER: -0.75
OS_AXIS: 074
OD_CYLINDER: -0.75

## 2025-06-16 ASSESSMENT — KERATOMETRY
OD_K2POWER_DIOPTERS: 46.00
METHOD_AUTO_MANUAL: AUTO
OS_AXISANGLE_DEGREES: 130
OD_AXISANGLE_DEGREES: 064
OS_K2POWER_DIOPTERS: 46.25
OS_K1POWER_DIOPTERS: 46.00
OD_K1POWER_DIOPTERS: 45.25

## 2025-06-16 ASSESSMENT — LID POSITION - PTOSIS
OS_PTOSIS: 2+
OD_PTOSIS: 2+

## 2025-06-16 ASSESSMENT — VISUAL ACUITY
OD_BCVA: 20/100-
OS_BCVA: 20/20-

## 2025-06-16 ASSESSMENT — PACHYMETRY
OD_CT_CORRECTION: -4
OD_CT_UM: 591
OS_CT_CORRECTION: -2
OS_CT_UM: 571

## 2025-06-16 ASSESSMENT — TONOMETRY
OD_IOP_MMHG: 19
OS_IOP_MMHG: 17
OS_IOP_MMHG: 18

## 2025-06-16 ASSESSMENT — CONFRONTATIONAL VISUAL FIELD TEST (CVF)
OS_FINDINGS: FULL
OD_FINDINGS: FULL

## 2025-08-04 ENCOUNTER — OFFICE (OUTPATIENT)
Dept: URBAN - METROPOLITAN AREA CLINIC 94 | Facility: CLINIC | Age: 65
Setting detail: OPHTHALMOLOGY
End: 2025-08-04
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
## 2025-08-04 PROCEDURE — NO SHOW FE NO SHOW FEE: Performed by: SPECIALIST

## 2025-08-06 ENCOUNTER — OFFICE (OUTPATIENT)
Dept: URBAN - METROPOLITAN AREA CLINIC 94 | Facility: CLINIC | Age: 65
Setting detail: OPHTHALMOLOGY
End: 2025-08-06
Payer: MEDICARE

## 2025-08-06 DIAGNOSIS — H35.3131: ICD-10-CM

## 2025-08-06 DIAGNOSIS — H44.23: ICD-10-CM

## 2025-08-06 DIAGNOSIS — H35.413: ICD-10-CM

## 2025-08-06 PROCEDURE — 92235 FLUORESCEIN ANGRPH MLTIFRAME: CPT | Performed by: OPHTHALMOLOGY

## 2025-08-06 PROCEDURE — 99212 OFFICE O/P EST SF 10 MIN: CPT | Performed by: OPHTHALMOLOGY

## 2025-08-06 PROCEDURE — 92134 CPTRZ OPH DX IMG PST SGM RTA: CPT | Performed by: OPHTHALMOLOGY

## 2025-08-06 ASSESSMENT — KERATOMETRY
OD_K2POWER_DIOPTERS: 46.00
METHOD_AUTO_MANUAL: AUTO
OS_K1POWER_DIOPTERS: 46.00
OS_K2POWER_DIOPTERS: 46.25
OS_AXISANGLE_DEGREES: 130
OD_AXISANGLE_DEGREES: 064
OD_K1POWER_DIOPTERS: 45.25

## 2025-08-06 ASSESSMENT — VISUAL ACUITY
OD_BCVA: 20/100-
OS_BCVA: 20/30

## 2025-08-06 ASSESSMENT — PACHYMETRY
OS_CT_CORRECTION: -2
OD_CT_UM: 591
OD_CT_CORRECTION: -4
OS_CT_UM: 571

## 2025-08-06 ASSESSMENT — REFRACTION_AUTOREFRACTION
OS_SPHERE: -2.25
OS_AXIS: 074
OD_CYLINDER: -0.75
OD_SPHERE: +0.50
OD_AXIS: 154
OS_CYLINDER: -0.75

## 2025-08-06 ASSESSMENT — CONFRONTATIONAL VISUAL FIELD TEST (CVF)
OD_FINDINGS: FULL
OS_FINDINGS: FULL

## 2025-08-06 ASSESSMENT — TONOMETRY
OD_IOP_MMHG: 20
OS_IOP_MMHG: 20

## 2025-08-06 ASSESSMENT — LID POSITION - PTOSIS
OS_PTOSIS: 2+
OD_PTOSIS: 2+